# Patient Record
(demographics unavailable — no encounter records)

---

## 2025-01-07 NOTE — END OF VISIT
[FreeTextEntry3] : I, Paige Queen, acted as a scribe on behalf of Dr. Loraine Veliz M.D. on 01/07/2025.   All medical entries made by the scribe were at my, Dr. Loraine Veliz M.D., direction and personally dictated by me on 01/07/2025. I have reviewed the chart and agree that the record accurately reflects my personal performance of the history, physical exam, assessment and plan. I have also personally directed, reviewed, and agreed with the chart.

## 2025-01-07 NOTE — ASSESSMENT
[Doing Well] : is doing well [Fair Pain Control] : has fair pain control [No Sign of Infection] : is showing no signs of infection [de-identified] :  31 year old female presents for post-op evaluation s/p robot-assisted laparoscopic myomectomy.

## 2025-01-07 NOTE — HISTORY OF PRESENT ILLNESS
[Pain is well-controlled] : pain is well-controlled [Clean/Dry/Intact] : clean, dry and intact [None] : no vaginal bleeding [Normal] : normal [Pathology reviewed] : pathology reviewed [Fever] : no fever [Chills] : no chills [Nausea] : no nausea [Vomiting] : no vomiting [Erythema] : not erythematous [de-identified] : 12/27/2024 [de-identified] : Robot-assisted laparoscopic myomectomy, exam under anesthesia. [de-identified] :  31 year old female presents for post-op evaluation s/p Robot-assisted laparoscopic myomectomy. Reports pain since yesterday. States not much pain before yesterday. Notes pain is more cramping pain. States recently started period. Notes bowel movement yesterday. Denies burning when peeing. Desires doctor note for returning to work. states her period was normal/ improved  [de-identified] : inc c/d/i, mild tenderness over LLQ, no rebound/guarding

## 2025-01-07 NOTE — PLAN
[FreeTextEntry1] :  31 year old female presents for post-op evaluation s/p robot-assisted laparoscopic myomectomy.  -pelvic rest, limited activity  -Recommended to return to work 6 weeks after surgery -Emailed doctors note  RTO in 3 weeks for full clearance. Loraine Veliz MD

## 2025-02-04 NOTE — HISTORY OF PRESENT ILLNESS
[Pain is well-controlled] : pain is well-controlled [Clean/Dry/Intact] : clean, dry and intact [Pathology reviewed] : pathology reviewed [Fever] : no fever [Chills] : no chills [Nausea] : no nausea [Vomiting] : no vomiting [Erythema] : not erythematous [Swelling] : not swollen [Dehiscence] : not dehisced [de-identified] : 12/27/2024 [de-identified] : Robot-assisted laparoscopic myomectomy, exam under anesthesia.  [de-identified] : 31 year old female presents for post-op evaluation s/p Robot-assisted laparoscopic myomectomy. Reports on her second period since surgery. States periods are heavy. Denies taking pain medication.  Denies any odor.

## 2025-02-04 NOTE — PLAN
[None] : None [FreeTextEntry1] : 31 year old female presents for post-op evaluation s/p Robot-assisted laparoscopic myomectomy.  -Full clearance -Advised no pregnancy for 6 months, will need c/s for future pregnancies -declining OCP/LARC  RTO for annual. Loraine Veliz MD

## 2025-02-04 NOTE — END OF VISIT
[FreeTextEntry3] : I, Paige Queen, acted as a scribe on behalf of Dr. Loraine Veliz M.D. on 02/04/2025.   All medical entries made by the scribe were at my, Dr. Loraine Veliz M.D., direction and personally dictated by me on 02/04/2025. I have reviewed the chart and agree that the record accurately reflects my personal performance of the history, physical exam, assessment and plan. I have also personally directed, reviewed, and agreed with the chart.

## 2025-02-04 NOTE — ASSESSMENT
[Doing Well] : is doing well [No Sign of Infection] : is showing no signs of infection [de-identified] :  31 year old female presents for post-op evaluation s/p Robot-assisted laparoscopic myomectomy. Full clearance.